# Patient Record
Sex: FEMALE | Race: OTHER
[De-identification: names, ages, dates, MRNs, and addresses within clinical notes are randomized per-mention and may not be internally consistent; named-entity substitution may affect disease eponyms.]

---

## 2022-04-07 PROBLEM — Z00.00 ENCOUNTER FOR PREVENTIVE HEALTH EXAMINATION: Status: ACTIVE | Noted: 2022-04-07

## 2024-03-11 ENCOUNTER — NON-APPOINTMENT (OUTPATIENT)
Age: 74
End: 2024-03-11

## 2024-03-11 DIAGNOSIS — Z78.9 OTHER SPECIFIED HEALTH STATUS: ICD-10-CM

## 2024-03-11 DIAGNOSIS — Z92.29 PERSONAL HISTORY OF OTHER DRUG THERAPY: ICD-10-CM

## 2024-03-11 RX ORDER — FLUOXETINE HYDROCHLORIDE 20 MG/1
20 CAPSULE ORAL
Refills: 0 | Status: ACTIVE | COMMUNITY

## 2024-04-11 ENCOUNTER — APPOINTMENT (OUTPATIENT)
Dept: HEMATOLOGY ONCOLOGY | Facility: CLINIC | Age: 74
End: 2024-04-11

## 2024-04-15 NOTE — HISTORY OF PRESENT ILLNESS
[de-identified] :  bilat mammo and sono revealed new irregular density 1.1 cm 1 o'clock axis Left, no axillary adenopathy, ultrasound-guided biopsy infiltrating lobular carcinoma with histiocytoid features, and LCIS, ER and IN pos, HER-2 equivocal and FISH not amplified.  Reportedly breast MRI done preop - no abnormalities other than known breast cancer.  16 Lt WLE and SLN - invasive lobular carcinoma and LCIS, both classical type with intermediate nuclear grade, 1.4 cm and focally involves PASH, no LVI, final margins neg, 2 SLN neg, ER > 50%, IN 20%, Ki-67 < 10%, HER-2/alberta 1+, and Oncotype score 24.  Patient received postop RT at [place].  She has been on Anastrozole since 2016.  She notes diffuse joint complaints, which are sometimes disabling, feeling old, fatigue, vaginal dryness, hot flashes, and weight gain 8 lb.  She has difficulty walking, Lt hip pain, and hands and shoulders hurt.  17, bilat mammo and sono were unremarkable and bone density 17 - significant bone loss, spine mild osteopenia, osteoporosis Lt femoral neck, and osteopenia total Lt hip.  She now presents for further evaluation.  CURRENT MEDS: Prozac 20 mg, Anastrozole 1 mg, D3, K3.  PMH: 16, LMP 66, G3, P2, AB1, GYN , CombiPatch HRT many Y, discontinued 13819,  40 and 44,  11 and 13 mo, S/P cosmetic surgery, had Pneumovax 2016, flu shot, and shingles Vac, colonoscopy 2 Y ago, BMD as per HPI.  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  Not exercising due to significant joint complaints.  FAMILY HX: [place] descent, remarkable for longevity.  Maternal grandmother had metastatic cancer to bone at 74.  Mother is 90 and only child.  Father 89 and had 1 sister and 1 brother.  Patient has 3 brothers, 1 sister, and 2 sons.

## 2024-04-15 NOTE — ASSESSMENT
[FreeTextEntry1] : Stage 1A classical lobular carcinoma T1cN0,  ER and /[place] pos and HER-2 neg with ORS 24.  OR,S path and prognosis reviewed in detail with patient.  Using Oncotype algorithm distant risk of relapse 6% to 9% with 5 Y of an AI.  Patient is having disabling joint complaints on Anastrozole

## 2024-04-18 ENCOUNTER — APPOINTMENT (OUTPATIENT)
Dept: HEMATOLOGY ONCOLOGY | Facility: CLINIC | Age: 74
End: 2024-04-18
Payer: MEDICARE

## 2024-04-18 VITALS
SYSTOLIC BLOOD PRESSURE: 145 MMHG | RESPIRATION RATE: 18 BRPM | WEIGHT: 138 LBS | DIASTOLIC BLOOD PRESSURE: 82 MMHG | HEIGHT: 66 IN | HEART RATE: 63 BPM | TEMPERATURE: 97.6 F | OXYGEN SATURATION: 100 % | BODY MASS INDEX: 22.18 KG/M2

## 2024-04-18 DIAGNOSIS — Z85.3 PERSONAL HISTORY OF MALIGNANT NEOPLASM OF BREAST: ICD-10-CM

## 2024-04-18 DIAGNOSIS — Z87.39 PERSONAL HISTORY OF OTHER DISEASES OF THE MUSCULOSKELETAL SYSTEM AND CONNECTIVE TISSUE: ICD-10-CM

## 2024-04-18 PROCEDURE — 99214 OFFICE O/P EST MOD 30 MIN: CPT

## 2024-04-18 PROCEDURE — G2211 COMPLEX E/M VISIT ADD ON: CPT

## 2024-04-18 RX ORDER — VITAMIN K2 40 MCG
40 TABLET ORAL DAILY
Refills: 0 | Status: ACTIVE | COMMUNITY
Start: 2024-04-18

## 2024-04-18 RX ORDER — LETROZOLE TABLETS 2.5 MG/1
2.5 TABLET, FILM COATED ORAL DAILY
Refills: 0 | Status: ACTIVE | COMMUNITY
Start: 2024-04-18

## 2024-04-18 RX ORDER — CHOLECALCIFEROL (VITAMIN D3) 25 MCG
25 MCG TABLET ORAL DAILY
Refills: 0 | Status: ACTIVE | COMMUNITY
Start: 2024-04-18

## 2024-08-14 ENCOUNTER — NON-APPOINTMENT (OUTPATIENT)
Age: 74
End: 2024-08-14

## 2024-10-01 ENCOUNTER — APPOINTMENT (OUTPATIENT)
Dept: HEMATOLOGY ONCOLOGY | Facility: CLINIC | Age: 74
End: 2024-10-01
Payer: MEDICARE

## 2024-10-01 VITALS
OXYGEN SATURATION: 95 % | RESPIRATION RATE: 18 BRPM | BODY MASS INDEX: 22.02 KG/M2 | WEIGHT: 137 LBS | HEIGHT: 66 IN | SYSTOLIC BLOOD PRESSURE: 109 MMHG | HEART RATE: 74 BPM | DIASTOLIC BLOOD PRESSURE: 72 MMHG | TEMPERATURE: 98.2 F

## 2024-10-01 DIAGNOSIS — Z87.39 PERSONAL HISTORY OF OTHER DISEASES OF THE MUSCULOSKELETAL SYSTEM AND CONNECTIVE TISSUE: ICD-10-CM

## 2024-10-01 DIAGNOSIS — Z79.811 LONG TERM (CURRENT) USE OF AROMATASE INHIBITORS: ICD-10-CM

## 2024-10-01 DIAGNOSIS — Z85.3 PERSONAL HISTORY OF MALIGNANT NEOPLASM OF BREAST: ICD-10-CM

## 2024-10-01 PROCEDURE — 99214 OFFICE O/P EST MOD 30 MIN: CPT

## 2024-10-01 PROCEDURE — G2211 COMPLEX E/M VISIT ADD ON: CPT

## 2024-10-01 NOTE — HISTORY OF PRESENT ILLNESS
[de-identified] :  bilat mammo and sono revealed new irregular density 1.1 cm 1 o'clock axis Left, no axillary adenopathy, ultrasound-guided biopsy infiltrating lobular carcinoma with histiocytoid features, and LCIS, ER and [place] pos, HER-2 equivocal and FISH not amplified.  Reportedly breast MRI done preop - no abnormalities other than known breast cancer.  16 Lt WLE and SLN - invasive lobular carcinoma and LCIS, both classical type with intermediate nuclear grade, 1.4 cm and focally involves PASH, no LVI, final margins neg, 2 SLN neg, ER > 50%, [place] 20%, Ki-67 < 10%, HER-2/alberta 1+, and Oncotype score 24.  Patient received postop RT at [place].  She has been on Anastrozole since 2016.  She notes diffuse joint complaints, which are sometimes disabling, feeling old, fatigue, vaginal dryness, hot flashes, and weight gain 8 lb.  She has difficulty walking, Lt hip pain, and hands and shoulders hurt.  17, bilat mammo and sono were unremarkable and bone density 17 - significant bone loss, spine mild osteopenia, osteoporosis Lt femoral neck, and osteopenia total Lt hip.  Anastazole  -      Letrozole   Takes  1month off /yr Tolerable. BCI benefit extended adjuvant  lyme at [place].  small ov cysts/ on TVS. Reportedly had bilat MMG/US    PMH: 16, LMP 66, G3, P2, AB1, GYN , CombiPatch HRT many Y, discontinued 2016,  40 and 44,  11 and 13 mo, S/P cosmetic surgery, had Pneumovax 2016,t, and shingles Vac, [id number] ago, BMD  osteopenia, fx l patella, hx lyme, bartonella and babesiosis.  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   exercising after care for knee 2- 3/wk, active.,  lives with   FAMILY HX: [place] descent, remarkable for longevity.  Maternal grandmother had metastatic cancer to bone at 74.  Mother is 60 and only child.  Father 89 and had 1 sister and 1 brother.  Patient has 3 brothers, 1 sister, and 2 sons.     [de-identified] : 10/1/24 had COVID 6wks. Lyme quiescent 8/14/24 Bilat MMG/US - [facility] 9/24 exercises several x/ wk, no smoking no etoh

## 2024-10-01 NOTE — PHYSICAL EXAM
[Fully active, able to carry on all pre-disease performance without restriction] : Status 0 - Fully active, able to carry on all pre-disease performance without restriction [Normal] : affect appropriate [de-identified] : Rt unremarkable, S/P Lt WLE/RT [de-identified] : prob benign <1cm density L forearm

## 2024-10-01 NOTE — ASSESSMENT
[FreeTextEntry1] : Stage 1A classical lobular carcinoma T1cN0,  ER and /PA pos and HER-2 neg with ORS 24.  ORS path and prognosis reviewed in detail with patient. Reviewed BCI - benefit for extended AI. Agrees to continue. Reviewed diet and exercise. For f/u BMD..8/25 Bilat MMG/US.  Sched pelvic US. Discussed genetic testing - pt will explore with counselor. Has health proxy and advance directive. F/U 6 months  [With Patient/Caregiver] : With Patient/Caregiver [AdvancecareDate] : 10/1/24 [Designated Health Care Proxy] : Designated Health Care Proxy [Name: ___] : Name: [unfilled] [Relationship: ___] : Relationship: [unfilled]

## 2024-10-01 NOTE — PHYSICAL EXAM
[Fully active, able to carry on all pre-disease performance without restriction] : Status 0 - Fully active, able to carry on all pre-disease performance without restriction [Normal] : affect appropriate [de-identified] : Rt unremarkable, S/P Lt WLE/RT [de-identified] : prob benign <1cm density L forearm

## 2024-10-01 NOTE — ASSESSMENT
[FreeTextEntry1] : Stage 1A classical lobular carcinoma T1cN0,  ER and /CA pos and HER-2 neg with ORS 24.  ORS path and prognosis reviewed in detail with patient. Reviewed BCI - benefit for extended AI. Agrees to continue. Reviewed diet and exercise. For f/u BMD..8/25 Bilat MMG/US.  Sched pelvic US. Discussed genetic testing - pt will explore with counselor. Has health proxy and advance directive. F/U 6 months  [With Patient/Caregiver] : With Patient/Caregiver [AdvancecareDate] : 10/1/24 [Designated Health Care Proxy] : Designated Health Care Proxy [Name: ___] : Name: [unfilled] [Relationship: ___] : Relationship: [unfilled]

## 2024-10-01 NOTE — HISTORY OF PRESENT ILLNESS
[de-identified] :  bilat mammo and sono revealed new irregular density 1.1 cm 1 o'clock axis Left, no axillary adenopathy, ultrasound-guided biopsy infiltrating lobular carcinoma with histiocytoid features, and LCIS, ER and [place] pos, HER-2 equivocal and FISH not amplified.  Reportedly breast MRI done preop - no abnormalities other than known breast cancer.  16 Lt WLE and SLN - invasive lobular carcinoma and LCIS, both classical type with intermediate nuclear grade, 1.4 cm and focally involves PASH, no LVI, final margins neg, 2 SLN neg, ER > 50%, [place] 20%, Ki-67 < 10%, HER-2/alberta 1+, and Oncotype score 24.  Patient received postop RT at [facility].  She has been on Anastrozole since 2016.  She notes diffuse joint complaints, which are sometimes disabling, feeling old, fatigue, vaginal dryness, hot flashes, and weight gain 8 lb.  She has difficulty walking, Lt hip pain, and hands and shoulders hurt.  17, bilat mammo and sono were unremarkable and bone density 17 - significant bone loss, spine mild osteopenia, osteoporosis Lt femoral neck, and osteopenia total Lt hip.  Anastazole  -      Letrozole   Takes  1month off /yr Tolerable. BCI benefit extended adjuvant  lyme at [place].  small ov cysts/ on TVS. Reportedly had bilat MMG/US    PMH: 16, LMP 66, G3, P2, AB1, GYN , CombiPatch HRT many Y, discontinued 2016,  40 and 44,  11 and 13 mo, S/P cosmetic surgery, had Pneumovax 2016,t, and shingles Vac, [id number] ago, BMD  osteopenia, fx l patella, hx lyme, bartonella and babesiosis.  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   exercising after care for knee 2- 3/wk, active.,  lives with   FAMILY HX: [place] descent, remarkable for longevity.  Maternal grandmother had metastatic cancer to bone at 74.  Mother is 60 and only child.  Father 89 and had 1 sister and 1 brother.  Patient has 3 brothers, 1 sister, and 2 sons.     [de-identified] : 10/1/24 had COVID 6wks. Lyme quiescent 8/14/24 Bilat MMG/US - [facility] 9/24 exercises several x/ wk, no smoking no etoh

## 2024-10-03 NOTE — BEGINNING OF VISIT
[0] : 2) Feeling down, depressed, or hopeless: Not at all (0) [PHQ-2 Negative] : PHQ-2 Negative [Pain Scale: ___] : On a scale of 1-10, today the patient's pain is a(n) [unfilled]. [Never] : Never [Date Discussed (MM/DD/YY): ___] : Discussed: [unfilled] [With Patient/Caregiver] : with Patient/Caregiver [Abdominal Pain] : no abdominal pain [Vomiting] : no vomiting [Constipation] : no constipation

## 2024-10-07 ENCOUNTER — APPOINTMENT (OUTPATIENT)
Dept: HEMATOLOGY ONCOLOGY | Facility: CLINIC | Age: 74
End: 2024-10-07

## 2024-11-06 ENCOUNTER — NON-APPOINTMENT (OUTPATIENT)
Age: 74
End: 2024-11-06

## 2025-03-27 ENCOUNTER — APPOINTMENT (OUTPATIENT)
Dept: HEMATOLOGY ONCOLOGY | Facility: CLINIC | Age: 75
End: 2025-03-27
Payer: MEDICARE

## 2025-03-27 ENCOUNTER — NON-APPOINTMENT (OUTPATIENT)
Age: 75
End: 2025-03-27

## 2025-03-27 VITALS
WEIGHT: 137.5 LBS | OXYGEN SATURATION: 100 % | HEART RATE: 72 BPM | DIASTOLIC BLOOD PRESSURE: 66 MMHG | BODY MASS INDEX: 22.1 KG/M2 | TEMPERATURE: 98.1 F | RESPIRATION RATE: 18 BRPM | HEIGHT: 66 IN | SYSTOLIC BLOOD PRESSURE: 118 MMHG

## 2025-03-27 DIAGNOSIS — N95.1 MENOPAUSAL AND FEMALE CLIMACTERIC STATES: ICD-10-CM

## 2025-03-27 DIAGNOSIS — T14.8XXA OTHER INJURY OF UNSPECIFIED BODY REGION, INITIAL ENCOUNTER: ICD-10-CM

## 2025-03-27 DIAGNOSIS — Z85.3 PERSONAL HISTORY OF MALIGNANT NEOPLASM OF BREAST: ICD-10-CM

## 2025-03-27 DIAGNOSIS — M81.0 AGE-RELATED OSTEOPOROSIS W/OUT CURRENT PATHOLOGICAL FRACTURE: ICD-10-CM

## 2025-03-27 DIAGNOSIS — Z79.811 LONG TERM (CURRENT) USE OF AROMATASE INHIBITORS: ICD-10-CM

## 2025-03-27 PROCEDURE — G2211 COMPLEX E/M VISIT ADD ON: CPT

## 2025-03-27 PROCEDURE — 99214 OFFICE O/P EST MOD 30 MIN: CPT

## 2025-03-27 RX ORDER — ACETAMINOPHEN AND IBUPROFEN 250; 125 MG/1; MG/1
250-125 TABLET ORAL
Refills: 0 | Status: ACTIVE | COMMUNITY
Start: 2025-03-27

## 2025-03-29 PROBLEM — T14.8XXA FRACTURE: Status: ACTIVE | Noted: 2025-03-29

## 2025-04-23 ENCOUNTER — NON-APPOINTMENT (OUTPATIENT)
Age: 75
End: 2025-04-23